# Patient Record
Sex: FEMALE | NOT HISPANIC OR LATINO | URBAN - METROPOLITAN AREA
[De-identification: names, ages, dates, MRNs, and addresses within clinical notes are randomized per-mention and may not be internally consistent; named-entity substitution may affect disease eponyms.]

---

## 2020-07-13 ENCOUNTER — OFFICE VISIT (OUTPATIENT)
Dept: URBAN - METROPOLITAN AREA CLINIC 128 | Facility: CLINIC | Age: 65
End: 2020-07-13

## 2020-07-13 RX ORDER — ERGOCALCIFEROL 1.25 MG/1
CAPSULE ORAL
Qty: 0 | Refills: 0 | COMMUNITY
Start: 1900-01-01

## 2020-07-13 RX ORDER — CIPROFLOXACIN HYDROCHLORIDE 500 MG/1
TABLET, FILM COATED ORAL
Qty: 0 | Refills: 0 | COMMUNITY
Start: 1900-01-01

## 2020-07-13 RX ORDER — METRONIDAZOLE 500 MG/1
TABLET, FILM COATED ORAL
Qty: 0 | Refills: 0 | COMMUNITY
Start: 1900-01-01

## 2021-02-01 ENCOUNTER — OFFICE VISIT (OUTPATIENT)
Dept: URBAN - METROPOLITAN AREA CLINIC 128 | Facility: CLINIC | Age: 66
End: 2021-02-01

## 2021-02-22 ENCOUNTER — WEB ENCOUNTER (OUTPATIENT)
Dept: URBAN - METROPOLITAN AREA CLINIC 128 | Facility: CLINIC | Age: 66
End: 2021-02-22

## 2021-03-15 ENCOUNTER — WEB ENCOUNTER (OUTPATIENT)
Dept: URBAN - METROPOLITAN AREA CLINIC 128 | Facility: CLINIC | Age: 66
End: 2021-03-15

## 2021-03-15 ENCOUNTER — OFFICE VISIT (OUTPATIENT)
Dept: URBAN - METROPOLITAN AREA CLINIC 128 | Facility: CLINIC | Age: 66
End: 2021-03-15
Payer: MEDICARE

## 2021-03-15 DIAGNOSIS — K59.01 CONSTIPATION BY DELAYED COLONIC TRANSIT: ICD-10-CM

## 2021-03-15 DIAGNOSIS — Z86.010 PERSONAL HISTORY OF COLONIC POLYPS: ICD-10-CM

## 2021-03-15 PROCEDURE — 99203 OFFICE O/P NEW LOW 30 MIN: CPT | Performed by: INTERNAL MEDICINE

## 2021-03-15 RX ORDER — CIPROFLOXACIN HYDROCHLORIDE 500 MG/1
TABLET, FILM COATED ORAL
Qty: 0 | Refills: 0 | Status: DISCONTINUED | COMMUNITY
Start: 1900-01-01

## 2021-03-15 RX ORDER — ERGOCALCIFEROL 1.25 MG/1
CAPSULE ORAL
Qty: 0 | Refills: 0 | Status: DISCONTINUED | COMMUNITY
Start: 1900-01-01

## 2021-03-15 RX ORDER — METRONIDAZOLE 500 MG/1
TABLET, FILM COATED ORAL
Qty: 0 | Refills: 0 | Status: DISCONTINUED | COMMUNITY
Start: 1900-01-01

## 2021-03-15 NOTE — HPI-TODAY'S VISIT:
Pleasant 66yo woman with hx of serrated adenoma at colonoscopy in 2017 with recommendation for a 3 year follow up here today to schedule procedure.  No GI symptoms except chronic constipation controlled with Mg supplement daily.  No bleeding. No blood thinners, no heart or lung disease.  Good exercise tolerance.  No UGI symptoms.  No renal disease.

## 2021-05-07 ENCOUNTER — OFFICE VISIT (OUTPATIENT)
Dept: URBAN - METROPOLITAN AREA SURGERY CENTER 19 | Facility: SURGERY CENTER | Age: 66
End: 2021-05-07
Payer: MEDICARE

## 2021-05-07 ENCOUNTER — CLAIMS CREATED FROM THE CLAIM WINDOW (OUTPATIENT)
Dept: URBAN - METROPOLITAN AREA CLINIC 4 | Facility: CLINIC | Age: 66
End: 2021-05-07
Payer: MEDICARE

## 2021-05-07 DIAGNOSIS — D12.2 BENIGN NEOPLASM OF ASCENDING COLON: ICD-10-CM

## 2021-05-07 DIAGNOSIS — D12.2 ADENOMA OF ASCENDING COLON: ICD-10-CM

## 2021-05-07 DIAGNOSIS — Z86.010 H/O ADENOMATOUS POLYP OF COLON: ICD-10-CM

## 2021-05-07 PROCEDURE — 45385 COLONOSCOPY W/LESION REMOVAL: CPT | Performed by: INTERNAL MEDICINE

## 2021-05-07 PROCEDURE — G8907 PT DOC NO EVENTS ON DISCHARG: HCPCS | Performed by: INTERNAL MEDICINE

## 2021-05-07 PROCEDURE — 88305 TISSUE EXAM BY PATHOLOGIST: CPT | Performed by: PATHOLOGY

## 2022-02-22 ENCOUNTER — TELEPHONE ENCOUNTER (OUTPATIENT)
Dept: URBAN - METROPOLITAN AREA CLINIC 128 | Facility: CLINIC | Age: 67
End: 2022-02-22

## 2022-03-03 ENCOUNTER — OFFICE VISIT (OUTPATIENT)
Dept: URBAN - METROPOLITAN AREA CLINIC 128 | Facility: CLINIC | Age: 67
End: 2022-03-03

## 2022-03-14 ENCOUNTER — OFFICE VISIT (OUTPATIENT)
Dept: URBAN - METROPOLITAN AREA CLINIC 128 | Facility: CLINIC | Age: 67
End: 2022-03-14

## 2022-03-28 ENCOUNTER — OFFICE VISIT (OUTPATIENT)
Dept: URBAN - METROPOLITAN AREA CLINIC 128 | Facility: CLINIC | Age: 67
End: 2022-03-28
Payer: MEDICARE

## 2022-03-28 VITALS
TEMPERATURE: 97.3 F | SYSTOLIC BLOOD PRESSURE: 130 MMHG | HEART RATE: 78 BPM | DIASTOLIC BLOOD PRESSURE: 76 MMHG | WEIGHT: 129 LBS | HEIGHT: 63 IN | BODY MASS INDEX: 22.86 KG/M2

## 2022-03-28 DIAGNOSIS — Z86.010 HISTORY OF ADENOMATOUS POLYP OF COLON: ICD-10-CM

## 2022-03-28 DIAGNOSIS — K57.30 DIVERTICULOSIS LARGE INTESTINE W/O PERFORATION OR ABSCESS W/O BLEEDING: ICD-10-CM

## 2022-03-28 DIAGNOSIS — K57.32 DIVERTICULITIS OF LARGE INTESTINE WITHOUT PERFORATION OR ABSCESS WITHOUT BLEEDING: ICD-10-CM

## 2022-03-28 PROBLEM — 429047008: Status: ACTIVE | Noted: 2022-03-28

## 2022-03-28 PROBLEM — 724538004: Status: ACTIVE | Noted: 2022-03-28

## 2022-03-28 PROBLEM — 397881000: Status: ACTIVE | Noted: 2022-03-28

## 2022-03-28 PROCEDURE — 99212 OFFICE O/P EST SF 10 MIN: CPT | Performed by: STUDENT IN AN ORGANIZED HEALTH CARE EDUCATION/TRAINING PROGRAM

## 2022-03-28 NOTE — HPI-TODAY'S VISIT:
The pt who sees Dr. Billingsley on a regular basis presents for a follow up after a bout of divertiulitis about a month or so ago that was treated with bactrim by her PCP (flagyl allergy).  She is better except for one further episode of abdominal spasms a couple of weeks ago, that resolved on its own overnight and has not recurred.  Pt had a colonoscopy in May 2021 < 1 year ago for a personal h/o polyps.  A 9-mm A-colon SSA was removed.  Otherwise she was noted to have significant diverticulae and some abdoinal spasms and narrowing in the left side.  No fevers, chills, hematochezia or other alarm symptoms.

## 2022-06-01 ENCOUNTER — TELEPHONE ENCOUNTER (OUTPATIENT)
Dept: URBAN - METROPOLITAN AREA CLINIC 92 | Facility: CLINIC | Age: 67
End: 2022-06-01

## 2022-06-01 RX ORDER — SULFAMETHOXAZOLE AND TRIMETHOPRIM 800; 160 MG/1; MG/1
1 TABLET TABLET ORAL TWICE A DAY
Qty: 20 | OUTPATIENT
Start: 2022-06-01 | End: 2022-06-11

## 2022-06-01 NOTE — HPI-TODAY'S VISIT:
patient called in with low grade fever and lower abdominal pain consistent with previous episodes of diverticulitis.  Sendin in Rx for Bactrim (adverse effect from flagyl) for tx of probable  diverticulitis.

## 2022-10-03 ENCOUNTER — OFFICE VISIT (OUTPATIENT)
Dept: URBAN - METROPOLITAN AREA CLINIC 128 | Facility: CLINIC | Age: 67
End: 2022-10-03
Payer: MEDICARE

## 2022-10-03 ENCOUNTER — DASHBOARD ENCOUNTERS (OUTPATIENT)
Age: 67
End: 2022-10-03

## 2022-10-03 VITALS
SYSTOLIC BLOOD PRESSURE: 138 MMHG | WEIGHT: 129 LBS | DIASTOLIC BLOOD PRESSURE: 72 MMHG | TEMPERATURE: 97.9 F | BODY MASS INDEX: 22.86 KG/M2 | HEIGHT: 63 IN

## 2022-10-03 DIAGNOSIS — R14.0 BLOATING SYMPTOM: ICD-10-CM

## 2022-10-03 DIAGNOSIS — K59.01 CONSTIPATION BY DELAYED COLONIC TRANSIT: ICD-10-CM

## 2022-10-03 DIAGNOSIS — K57.32 DIVERTICULITIS OF LARGE INTESTINE WITHOUT PERFORATION OR ABSCESS WITHOUT BLEEDING: ICD-10-CM

## 2022-10-03 DIAGNOSIS — Z86.010 PERSONAL HISTORY OF COLONIC POLYPS: ICD-10-CM

## 2022-10-03 PROBLEM — 4494009: Status: ACTIVE | Noted: 2022-03-28

## 2022-10-03 PROBLEM — 35298007: Status: ACTIVE | Noted: 2021-03-15

## 2022-10-03 PROCEDURE — 99213 OFFICE O/P EST LOW 20 MIN: CPT | Performed by: INTERNAL MEDICINE

## 2022-10-03 RX ORDER — LINACLOTIDE 290 UG/1
1 CAPSULE AT LEAST 30 MINUTES BEFORE THE FIRST MEAL OF THE DAY ON AN EMPTY STOMACH CAPSULE, GELATIN COATED ORAL ONCE A DAY
Qty: 90 | Refills: 3 | OUTPATIENT
Start: 2022-10-03 | End: 2023-09-28

## 2022-10-03 NOTE — HPI-TODAY'S VISIT:
Mrs. Guerrero comes in for a follow up visit because of increased difficulty with bloating over the past couple of months.  She offers that BMs are daily on Mg supplement.  The stool volume is often small and if this occurs for 2-3 days in a row she will take extra Mg that induces a really large evacuation.  She will feel better with less bloating for a day or two but it always recurs.  Colonoscopy was last performed less than 2 years ago. No overt GI bleeding.  There are hemorrhoids.  Weight stable and appetite is good. No UGI symptoms.  No obvious lactose intolerance or other dietary intolerances.

## 2022-10-03 NOTE — PHYSICAL EXAM GASTROINTESTINAL
Abdomen soft, nontender, mildly distended, no masses palpable , normal bowel sounds   Liver and Spleen , no hepatomegaly present, liver edge nontender , spleen not palpable   Rectal: deferred

## 2024-06-11 ENCOUNTER — TELEPHONE ENCOUNTER (OUTPATIENT)
Dept: URBAN - METROPOLITAN AREA CLINIC 128 | Facility: CLINIC | Age: 69
End: 2024-06-11

## 2024-06-13 ENCOUNTER — OFFICE VISIT (OUTPATIENT)
Dept: URBAN - METROPOLITAN AREA CLINIC 128 | Facility: CLINIC | Age: 69
End: 2024-06-13
Payer: MEDICARE

## 2024-06-13 VITALS
HEIGHT: 63 IN | SYSTOLIC BLOOD PRESSURE: 122 MMHG | WEIGHT: 131.6 LBS | BODY MASS INDEX: 23.32 KG/M2 | DIASTOLIC BLOOD PRESSURE: 70 MMHG | HEART RATE: 75 BPM

## 2024-06-13 DIAGNOSIS — R68.81 EARLY SATIETY: ICD-10-CM

## 2024-06-13 DIAGNOSIS — R11.0 NAUSEA: ICD-10-CM

## 2024-06-13 DIAGNOSIS — R14.0 ABDOMINAL BLOATING: ICD-10-CM

## 2024-06-13 PROCEDURE — 99213 OFFICE O/P EST LOW 20 MIN: CPT | Performed by: PHYSICIAN ASSISTANT

## 2024-06-13 RX ORDER — PANTOPRAZOLE SODIUM 40 MG/1
1 TABLET TABLET, DELAYED RELEASE ORAL ONCE A DAY
Qty: 30 | OUTPATIENT
Start: 2024-06-13

## 2024-06-13 NOTE — HPI-TODAY'S VISIT:
patient c/o bloating after eating and her bloating is not really resolving, she cant  finish her meals because she feels full even after eating a protein bar she gets bloated and full. her symptoms started about 2 weeks ago. she takes magnesium qhs for constipation and has BMs daily. her abdomen does not improve with BM, she denies any burping or flatus. she had severe nausea or tuesday morning no vomiting. she denies any weight loss in the last month. she had a flare of diverticulitis 12/2023 that resolved with antibiotics and has been doing well. she denies any abdominal pain. no family history of stomach cancer but her brother has leukemia. she had no previous work up for the symptoms this is her first doctor visit for it.

## 2024-07-03 ENCOUNTER — OFFICE VISIT (OUTPATIENT)
Dept: URBAN - METROPOLITAN AREA SURGERY CENTER 31 | Facility: SURGERY CENTER | Age: 69
End: 2024-07-03

## 2024-08-07 ENCOUNTER — OFFICE VISIT (OUTPATIENT)
Dept: URBAN - METROPOLITAN AREA CLINIC 128 | Facility: CLINIC | Age: 69
End: 2024-08-07
Payer: MEDICARE

## 2024-08-07 VITALS
OXYGEN SATURATION: 98 % | HEIGHT: 63 IN | BODY MASS INDEX: 23.57 KG/M2 | TEMPERATURE: 97.9 F | WEIGHT: 133 LBS | SYSTOLIC BLOOD PRESSURE: 156 MMHG | HEART RATE: 80 BPM | DIASTOLIC BLOOD PRESSURE: 98 MMHG

## 2024-08-07 DIAGNOSIS — K59.01 CONSTIPATION BY DELAYED COLONIC TRANSIT: ICD-10-CM

## 2024-08-07 DIAGNOSIS — K57.32 DIVERTICULITIS OF LARGE INTESTINE WITHOUT PERFORATION OR ABSCESS WITHOUT BLEEDING: ICD-10-CM

## 2024-08-07 DIAGNOSIS — Z86.010 PERSONAL HISTORY OF COLONIC POLYPS: ICD-10-CM

## 2024-08-07 DIAGNOSIS — R14.0 ABDOMINAL BLOATING: ICD-10-CM

## 2024-08-07 DIAGNOSIS — R10.13 EPIGASTRIC PRESSURE: ICD-10-CM

## 2024-08-07 DIAGNOSIS — R11.0 NAUSEA: ICD-10-CM

## 2024-08-07 PROCEDURE — 99214 OFFICE O/P EST MOD 30 MIN: CPT | Performed by: INTERNAL MEDICINE

## 2024-08-07 RX ORDER — PANTOPRAZOLE SODIUM 40 MG/1
1 TABLET TABLET, DELAYED RELEASE ORAL ONCE A DAY
Qty: 30 | Status: ON HOLD | COMMUNITY
Start: 2024-06-13

## 2024-08-07 NOTE — HPI-TODAY'S VISIT:
Mrs. Guerrero comes in for a follow up visit.  She offers that postprandial bloating, epigastric discomfort and pressure persists despite pantoprazole.  She was unable to keep her appt for EGD because of a family situation.  She continues to have a lot of issues with constipation for which she takes Mg supplement.  She feels that BMs are usually fairly complete.  BMs do help with her fullness and pressure however.  No bleeding.  No cramping.  Occasional nausea noted.  No typical GERD symptoms.  No regular NSAIDs or ETOH.  She is also due for colon adenoma surveillance based upon prior recommendations.  She may have started fish oil supplement about the time bloating and pressure became an issue.

## 2024-08-07 NOTE — PHYSICAL EXAM NEUROLOGIC:
oriented to person, place and time. Grossly normal motor function and sensation, facial cranial nerves grossly normal
No complaints

## 2024-08-09 PROBLEM — 428283002: Status: ACTIVE | Noted: 2024-08-09

## 2024-09-30 ENCOUNTER — OFFICE VISIT (OUTPATIENT)
Dept: URBAN - METROPOLITAN AREA SURGERY CENTER 31 | Facility: SURGERY CENTER | Age: 69
End: 2024-09-30

## 2024-09-30 RX ORDER — PANTOPRAZOLE SODIUM 40 MG/1
1 TABLET TABLET, DELAYED RELEASE ORAL ONCE A DAY
Qty: 30 | Status: ON HOLD | COMMUNITY
Start: 2024-06-13

## 2024-10-15 ENCOUNTER — OFFICE VISIT (OUTPATIENT)
Dept: URBAN - METROPOLITAN AREA CLINIC 128 | Facility: CLINIC | Age: 69
End: 2024-10-15
Payer: MEDICARE

## 2024-10-15 VITALS
WEIGHT: 136 LBS | DIASTOLIC BLOOD PRESSURE: 80 MMHG | TEMPERATURE: 97.7 F | HEART RATE: 67 BPM | BODY MASS INDEX: 24.1 KG/M2 | RESPIRATION RATE: 97 BRPM | HEIGHT: 63 IN | SYSTOLIC BLOOD PRESSURE: 140 MMHG

## 2024-10-15 DIAGNOSIS — K22.70 BARRETT'S ESOPHAGUS WITHOUT DYSPLASIA: ICD-10-CM

## 2024-10-15 DIAGNOSIS — K44.9 HIATAL HERNIA: ICD-10-CM

## 2024-10-15 DIAGNOSIS — K57.30 ACQUIRED DIVERTICULOSIS OF COLON: ICD-10-CM

## 2024-10-15 DIAGNOSIS — Z86.0100 PERSONAL HISTORY OF COLONIC POLYPS: ICD-10-CM

## 2024-10-15 PROBLEM — 302914006: Status: ACTIVE | Noted: 2024-10-15

## 2024-10-15 PROCEDURE — 99213 OFFICE O/P EST LOW 20 MIN: CPT | Performed by: PHYSICIAN ASSISTANT

## 2024-10-15 RX ORDER — OMEPRAZOLE 40 MG/1
1 CAPSULE 30 MINUTES BEFORE MORNING MEAL CAPSULE, DELAYED RELEASE ORAL ONCE A DAY
Qty: 90 | Refills: 3 | OUTPATIENT
Start: 2024-10-15

## 2024-10-15 RX ORDER — PANTOPRAZOLE SODIUM 40 MG/1
1 TABLET TABLET, DELAYED RELEASE ORAL ONCE A DAY
Qty: 30 | Status: ON HOLD | COMMUNITY
Start: 2024-06-13

## 2024-10-15 NOTE — HPI-TODAY'S VISIT:
Patient here for f/u for EGD and colonoscopy. She has been feeling well, taking Magnesium for constipation. She denies any acid reflux or indigestion. EGD 9/30/2024: 3 cm hiatal hernia, patulus esophagus. Bx reflux esophagitis, Mcclain's esophagitis. repeat EGD in 2 yrs Colonoscopy 9/30/2024: diverticulosis of sigmoid colon, 1 polyp removed. Bx hyperplastic. repeat in 3 yrs

## 2025-02-12 NOTE — PHYSICAL EXAM MUSCULOSKELETAL:
Kenalog cream was ordered to be used twice daily on affected areas of the bilateral hands.  I did advise the patient to also use moisturizing cream on the affected areas in between doses of Kenalog.  Orders:  •  triamcinolone (KENALOG) 0.1 % cream; Apply topically 2 (two) times a day   normal gait and station, no joint deformities present, No joint erythema or warmth